# Patient Record
Sex: FEMALE | Race: WHITE
[De-identification: names, ages, dates, MRNs, and addresses within clinical notes are randomized per-mention and may not be internally consistent; named-entity substitution may affect disease eponyms.]

---

## 2020-01-20 ENCOUNTER — HOSPITAL ENCOUNTER (EMERGENCY)
Dept: HOSPITAL 74 - JERFT | Age: 30
Discharge: HOME | End: 2020-01-20
Payer: SELF-PAY

## 2020-01-20 VITALS — DIASTOLIC BLOOD PRESSURE: 79 MMHG | SYSTOLIC BLOOD PRESSURE: 125 MMHG | TEMPERATURE: 98 F | HEART RATE: 77 BPM

## 2020-01-20 VITALS — BODY MASS INDEX: 32.3 KG/M2

## 2020-01-20 DIAGNOSIS — G43.909: Primary | ICD-10-CM

## 2020-01-20 PROCEDURE — 3E023GC INTRODUCTION OF OTHER THERAPEUTIC SUBSTANCE INTO MUSCLE, PERCUTANEOUS APPROACH: ICD-10-PCS | Performed by: STUDENT IN AN ORGANIZED HEALTH CARE EDUCATION/TRAINING PROGRAM

## 2020-01-20 NOTE — PDOC
Rapid Medical Evaluation


Time Seen by Provider: 01/20/20 15:57


Medical Evaluation: 





01/20/20 15:57


I performed a brief in-person evaluation of this patient.


Healthy 29-year-old female with one week of nausea and headaches, only partial/

short-term relief with Motrin.





Pertinent physical exam findings.


Alert, oriented.


No focal neurologic deficits.





I have ordered the following:


Pgu





Patient to proceed to [facility] for further evaluation.





**Discharge Disposition





- Diagnosis


 Headache








- Referrals





- Patient Instructions





- Post Discharge Activity

## 2020-01-20 NOTE — PDOC
History of Present Illness





- General


Chief Complaint: Headache


Stated Complaint: HEADACHE/NAUSEOUS


Time Seen by Provider: 01/20/20 15:57


History Source: Patient


Exam Limitations: No Limitations





Past History





- Past Medical History


Allergies/Adverse Reactions: 


 Allergies











Allergy/AdvReac Type Severity Reaction Status Date / Time


 


No Known Allergies Allergy   Verified 01/20/20 16:00











Home Medications: 


Ambulatory Orders





NK [No Known Home Medication]  01/20/20 








COPD: No





- Psycho Social/Smoking Cessation Hx


Smoking History: Never smoked





*Physical Exam





- Vital Signs


 Last Vital Signs











Temp Pulse Resp BP Pulse Ox


 


 98 F   77   18   125/79   98 


 


 01/20/20 15:56  01/20/20 15:56  01/20/20 15:56  01/20/20 15:56  01/20/20 15:56














- Physical Exam


General Appearance: No: Apparent Distress


HEENT: positive: EOMI, RICARDO


Respiratory/Chest: positive: Lungs Clear, Normal Breath Sounds.  negative: 

Respiratory Distress


Cardiovascular: positive: Regular Rhythm, Regular Rate, S1, S2.  negative: 

Murmur


Gastrointestinal/Abdominal: positive: Normal Bowel Sounds, Soft.  negative: 

Tender, Distended, Guarding, Rebound


Neurologic: positive: CNs II-XII NML intact, Fully Oriented, Alert, Normal Mood/

Affect, Motor Strength 5/5





ED Treatment Course





- Medications


Given in the ED: 


ED Medications














Discontinued Medications














Generic Name Dose Route Start Last Admin





  Trade Name Freq  PRN Reason Stop Dose Admin


 


Acetaminophen  975 mg  01/20/20 16:35  01/20/20 16:45





  Tylenol -  PO  01/20/20 16:36  975 mg





  ONCE ONE   Administration





     





     





     





     


 


Metoclopramide HCl  10 mg  01/20/20 16:36  01/20/20 16:45





  Reglan Injection -  IM  01/20/20 16:37  10 mg





  ONCE ONE   Administration





     





     





     





     














Medical Decision Making





- Medical Decision Making








28 y/o F with no sig pmh presents with gradual onset of generalized HA x 1 week 

along with nausea, worse with light, noise and smell. Has been taking Motrin 

and Tylenol with minimal relief of pain. Denies fever, URI sxs, sob, cp, abd 

pain, n/v, numbness/tingling/weakness of extremities, visual/gait changes





Likely migraine HA


Given Tylenol, Reglan


Will reassess





01/20/20 17:13





patient feeling much better on reassessment


stable for dc





01/20/20 17:45








Discharge





- Discharge Information


Problems reviewed: Yes


Clinical Impression/Diagnosis: 


Migraine


Qualifiers:


 Migraine type: unspecified Status migrainosus presence: without status 

migrainosus Intractability: not intractable Qualified Code(s): G43.909 - 

Migraine, unspecified, not intractable, without status migrainosus





Condition: Stable


Disposition: HOME





- Admission


No





- Additional Discharge Information


Prescription Drug Monitoring Program (I-STOP) results: I-STOP not reviewed





- Follow up/Referral


Referrals: 


Johnathan Najera MD [Staff Physician] - 2 Days





- Patient Discharge Instructions


Patient Printed Discharge Instructions:  [facility] for Migraine


Additional Instructions: 





Thank you for choosing [facility].  It was a pleasure taking 

care of you.  





You were treated here for migraine


You were referred to neurologist for further evaluation





Return to the Emergency Department if your symptoms worsen or persist or have 

other concerning symptoms. 





- Post Discharge Activity